# Patient Record
(demographics unavailable — no encounter records)

---

## 2025-01-08 NOTE — PHYSICAL EXAM
[General Appearance - Well Developed] : well developed [Normal Appearance] : normal appearance [Well Groomed] : well groomed [General Appearance - Well Nourished] : well nourished [No Deformities] : no deformities [General Appearance - In No Acute Distress] : no acute distress [Normal Conjunctiva] : the conjunctiva exhibited no abnormalities [Eyelids - No Xanthelasma] : the eyelids demonstrated no xanthelasmas [Normal Oral Mucosa] : normal oral mucosa [No Oral Pallor] : no oral pallor [No Oral Cyanosis] : no oral cyanosis [Normal Jugular Venous A Waves Present] : normal jugular venous A waves present [Normal Jugular Venous V Waves Present] : normal jugular venous V waves present [No Jugular Venous Domínguez A Waves] : no jugular venous domínguez A waves [Respiration, Rhythm And Depth] : normal respiratory rhythm and effort [Exaggerated Use Of Accessory Muscles For Inspiration] : no accessory muscle use [Auscultation Breath Sounds / Voice Sounds] : lungs were clear to auscultation bilaterally [Heart Rate And Rhythm] : heart rate and rhythm were normal [Heart Sounds] : normal S1 and S2 [Murmurs] : no murmurs present [Abdomen Soft] : soft [Abdomen Tenderness] : non-tender [Abdomen Mass (___ Cm)] : no abdominal mass palpated [Abnormal Walk] : normal gait [Gait - Sufficient For Exercise Testing] : the gait was sufficient for exercise testing [Nail Clubbing] : no clubbing of the fingernails [Cyanosis, Localized] : no localized cyanosis [Petechial Hemorrhages (___cm)] : no petechial hemorrhages [Skin Color & Pigmentation] : normal skin color and pigmentation [] : no rash [No Venous Stasis] : no venous stasis [Skin Lesions] : no skin lesions [No Skin Ulcers] : no skin ulcer [No Xanthoma] : no  xanthoma was observed [Oriented To Time, Place, And Person] : oriented to person, place, and time [Affect] : the affect was normal [Mood] : the mood was normal [No Anxiety] : not feeling anxious

## 2025-01-08 NOTE — PHYSICAL EXAM
[General Appearance - Well Developed] : well developed [Normal Appearance] : normal appearance [Well Groomed] : well groomed [General Appearance - Well Nourished] : well nourished [No Deformities] : no deformities [General Appearance - In No Acute Distress] : no acute distress [Normal Conjunctiva] : the conjunctiva exhibited no abnormalities [Eyelids - No Xanthelasma] : the eyelids demonstrated no xanthelasmas [Normal Oral Mucosa] : normal oral mucosa [No Oral Pallor] : no oral pallor [No Oral Cyanosis] : no oral cyanosis [Normal Jugular Venous A Waves Present] : normal jugular venous A waves present [Normal Jugular Venous V Waves Present] : normal jugular venous V waves present [No Jugular Venous Domínguez A Waves] : no jugular venous domínguez A waves [Exaggerated Use Of Accessory Muscles For Inspiration] : no accessory muscle use [Respiration, Rhythm And Depth] : normal respiratory rhythm and effort [Auscultation Breath Sounds / Voice Sounds] : lungs were clear to auscultation bilaterally [Heart Rate And Rhythm] : heart rate and rhythm were normal [Heart Sounds] : normal S1 and S2 [Murmurs] : no murmurs present [Abdomen Soft] : soft [Abdomen Tenderness] : non-tender [Abdomen Mass (___ Cm)] : no abdominal mass palpated [Abnormal Walk] : normal gait [Gait - Sufficient For Exercise Testing] : the gait was sufficient for exercise testing [Nail Clubbing] : no clubbing of the fingernails [Cyanosis, Localized] : no localized cyanosis [Petechial Hemorrhages (___cm)] : no petechial hemorrhages [Skin Color & Pigmentation] : normal skin color and pigmentation [] : no rash [No Venous Stasis] : no venous stasis [Skin Lesions] : no skin lesions [No Skin Ulcers] : no skin ulcer [No Xanthoma] : no  xanthoma was observed [Oriented To Time, Place, And Person] : oriented to person, place, and time [Affect] : the affect was normal [Mood] : the mood was normal [No Anxiety] : not feeling anxious

## 2025-01-09 NOTE — HISTORY OF PRESENT ILLNESS
[FreeTextEntry1] : 1/9/2025  Doing well  no CP or SOB had a PET scan 11/14/2024 scattered plaque in the aorta and also coronary calcifications She reports BP is usually good, but since holidays a bit higher and weight gain   Hb 14..5 Creatine 0.86 LFTs normal   Medications: Marye Arimidex Synthroid 88mcg LExapro Rosuvastatin 10mg daily  Aspirin 81mg daily  Amlodipine 5 daily  omeprazole 20mg daily  lysine  3/2024 Carotid US discussed with patient:  1. Mild to moderate calcified plaque visualized at the right CCA bulb without hemodynamically significant stenosis. 2. Moderate calcified plaque visualized at the left CCA bulb without hemodynamically significant stenosis. 3. Vertebral arteries: antegrade flow bilaterally.  Recommend repeat Carotid duplex in 2 years. Office visit in 1 year.  1/25/2024  Since last visit patient reports doing well PET scans have been ok  No changes to her meds feels well travel to [place] without issues.  Hb 14.2  Small cluster of spider veins L leg, non-tender  Medications: Ernie Arimidex Synthroid 88mcg Effexor Rosuvastatin 10mg daily  Aspirin 81mg daily  Amlodipine 5 daily  omeprazole 20mg daily  lysine  8/4/2022  She has a PET scan scheduled in November 2022, just seen by oncology CBC was checked with onc Had a few episodes of leg sweilling, it was a hot day and she was sitting, this resolved. Currently there is no edema no CP or SOB No palptitations. no blood in the urine or stool   Medications: Marye Arimidex Synthroid 88mcg Effexor Rosuvastatin 10mg daily  Aspirin 81mg daily  Amlodipine 5 daily  omeprazole 20mg daily    2/3/2022  Sees oncologist 1 x per month PET scans all stable.  Nov 2021 no chest pain . Breathing is ok  No leg swelling.  Small cluster of spider veins L leg, non-tender No palpitations mild edema on exam  Medications: Ibranze Arimidex Synthroid 88mcg Effexor Rosuvastatin 10mg daily  Aspirin 81mg daily  Amlodipine 2.5mg daily

## 2025-01-09 NOTE — ASSESSMENT
[FreeTextEntry1] : Assessment: 1. Left leg pain - resolved - prior edema - no edema currently - no signs or symptoms of PE - duplex negative for DVT 2. Breast CA - stage 4 - PET scan improved 3. Hypothryroidism 4. Prior Abdominal surgeries 5. Former Smoker 6. Peripheral artery disease 7. Bakers cyst on the right leg.   Plan: 1. Continue aspirin, crestor- last LDL in Feb 2024 - was 60.   2. BP control:  amlodipine 5mg daily 3. Echocardiogram in March 2022 was fine, will repeat echo given athero seen on PET scan in coronaries and aorta.   4. Carotid duplex to surveillance in March 2025.   5.  Meds refilled 6.  RTC in 1 year  7.  PCP to check lipid panel next time    .